# Patient Record
Sex: MALE | Race: WHITE | NOT HISPANIC OR LATINO | Employment: FULL TIME | URBAN - METROPOLITAN AREA
[De-identification: names, ages, dates, MRNs, and addresses within clinical notes are randomized per-mention and may not be internally consistent; named-entity substitution may affect disease eponyms.]

---

## 2020-09-30 PROBLEM — S29.011A PECTORALIS MUSCLE RUPTURE, INITIAL ENCOUNTER: Status: ACTIVE | Noted: 2020-09-30

## 2020-09-30 PROBLEM — S29.011A PECTORALIS MUSCLE RUPTURE: Status: ACTIVE | Noted: 2020-09-30

## 2020-09-30 PROBLEM — S29.011A STRAIN OF LEFT PECTORALIS MUSCLE: Status: ACTIVE | Noted: 2020-09-30

## 2022-01-04 DIAGNOSIS — U07.1 COVID-19 VIRUS DETECTED: ICD-10-CM

## 2022-03-18 PROBLEM — S29.011A PECTORALIS MUSCLE RUPTURE, INITIAL ENCOUNTER: Status: RESOLVED | Noted: 2020-09-30 | Resolved: 2022-03-18

## 2022-03-18 PROBLEM — S29.011A PECTORALIS MUSCLE RUPTURE: Status: RESOLVED | Noted: 2020-09-30 | Resolved: 2022-03-18

## 2022-03-18 PROBLEM — F90.2 ATTENTION DEFICIT HYPERACTIVITY DISORDER (ADHD), COMBINED TYPE: Status: ACTIVE | Noted: 2022-03-18

## 2022-03-18 PROBLEM — Z98.890: Status: ACTIVE | Noted: 2022-03-18

## 2022-03-18 PROBLEM — S29.011A STRAIN OF LEFT PECTORALIS MUSCLE: Status: RESOLVED | Noted: 2020-09-30 | Resolved: 2022-03-18

## 2023-06-19 ENCOUNTER — OFFICE VISIT (OUTPATIENT)
Dept: FAMILY MEDICINE | Facility: CLINIC | Age: 28
End: 2023-06-19
Payer: COMMERCIAL

## 2023-06-19 VITALS
HEART RATE: 88 BPM | SYSTOLIC BLOOD PRESSURE: 118 MMHG | DIASTOLIC BLOOD PRESSURE: 78 MMHG | BODY MASS INDEX: 32.19 KG/M2 | OXYGEN SATURATION: 97 % | HEIGHT: 72 IN | WEIGHT: 237.63 LBS | RESPIRATION RATE: 18 BRPM

## 2023-06-19 DIAGNOSIS — F98.8 ADD (ATTENTION DEFICIT DISORDER) WITHOUT HYPERACTIVITY: ICD-10-CM

## 2023-06-19 DIAGNOSIS — F90.2 ATTENTION DEFICIT HYPERACTIVITY DISORDER (ADHD), COMBINED TYPE: ICD-10-CM

## 2023-06-19 PROCEDURE — 99999 PR PBB SHADOW E&M-EST. PATIENT-LVL IV: CPT | Mod: PBBFAC,,, | Performed by: NURSE PRACTITIONER

## 2023-06-19 PROCEDURE — 1160F RVW MEDS BY RX/DR IN RCRD: CPT | Mod: S$GLB,,, | Performed by: NURSE PRACTITIONER

## 2023-06-19 PROCEDURE — 3074F PR MOST RECENT SYSTOLIC BLOOD PRESSURE < 130 MM HG: ICD-10-PCS | Mod: S$GLB,,, | Performed by: NURSE PRACTITIONER

## 2023-06-19 PROCEDURE — 99214 OFFICE O/P EST MOD 30 MIN: CPT | Mod: S$GLB,,, | Performed by: NURSE PRACTITIONER

## 2023-06-19 PROCEDURE — 3008F BODY MASS INDEX DOCD: CPT | Mod: S$GLB,,, | Performed by: NURSE PRACTITIONER

## 2023-06-19 PROCEDURE — 99214 PR OFFICE/OUTPT VISIT, EST, LEVL IV, 30-39 MIN: ICD-10-PCS | Mod: S$GLB,,, | Performed by: NURSE PRACTITIONER

## 2023-06-19 PROCEDURE — 1159F PR MEDICATION LIST DOCUMENTED IN MEDICAL RECORD: ICD-10-PCS | Mod: S$GLB,,, | Performed by: NURSE PRACTITIONER

## 2023-06-19 PROCEDURE — 99999 PR PBB SHADOW E&M-EST. PATIENT-LVL IV: ICD-10-PCS | Mod: PBBFAC,,, | Performed by: NURSE PRACTITIONER

## 2023-06-19 PROCEDURE — 3074F SYST BP LT 130 MM HG: CPT | Mod: S$GLB,,, | Performed by: NURSE PRACTITIONER

## 2023-06-19 PROCEDURE — 3008F PR BODY MASS INDEX (BMI) DOCUMENTED: ICD-10-PCS | Mod: S$GLB,,, | Performed by: NURSE PRACTITIONER

## 2023-06-19 PROCEDURE — 1160F PR REVIEW ALL MEDS BY PRESCRIBER/CLIN PHARMACIST DOCUMENTED: ICD-10-PCS | Mod: S$GLB,,, | Performed by: NURSE PRACTITIONER

## 2023-06-19 PROCEDURE — 1159F MED LIST DOCD IN RCRD: CPT | Mod: S$GLB,,, | Performed by: NURSE PRACTITIONER

## 2023-06-19 PROCEDURE — 3078F PR MOST RECENT DIASTOLIC BLOOD PRESSURE < 80 MM HG: ICD-10-PCS | Mod: S$GLB,,, | Performed by: NURSE PRACTITIONER

## 2023-06-19 PROCEDURE — 3078F DIAST BP <80 MM HG: CPT | Mod: S$GLB,,, | Performed by: NURSE PRACTITIONER

## 2023-06-19 RX ORDER — LISDEXAMFETAMINE DIMESYLATE 70 MG/1
70 CAPSULE ORAL EVERY MORNING
Qty: 30 CAPSULE | Refills: 0 | Status: CANCELLED | OUTPATIENT
Start: 2023-06-19

## 2023-06-19 RX ORDER — LISDEXAMFETAMINE DIMESYLATE 70 MG/1
70 CAPSULE ORAL EVERY MORNING
Qty: 90 CAPSULE | Refills: 0 | Status: SHIPPED | OUTPATIENT
Start: 2023-06-19 | End: 2023-09-20 | Stop reason: SDUPTHER

## 2023-06-19 NOTE — PROGRESS NOTES
Subjective:       Patient ID: Simon Mcdonough is a 27 y.o. male.    Chief Complaint: Establish Care (Med refill)  -  The pt is a 26 y/o male that presents to [facility] and for refills. Previous PCP SUSAN Solorzano now living in [place], [place]    Requests ADD medication refill. Reports symptoms of impulsiveness, poor time management skills, trouble multitasking, problems focusing on a task, disorganization and problems prioritizing are improved and manageable with current medication regimen. Denies s/e of heart palpations, insomnia, weight is stable and  is consistent.  consistent as well as previous PCP notes.     HPV vac discussed.  TRINA with use of cpap nightly.   -       Past Medical History:   Diagnosis Date    ADD (attention deficit disorder) without hyperactivity     TRINA on CPAP     Mild       Past Surgical History:   Procedure Laterality Date    BICEPS TENDON REPAIR Right 10/5/2020    Procedure: Right pec major tendon repair vs possbile achilles tendon allograft reconstruction;  Surgeon: Homero Mascorro MD;  Location: [facility] MAIN OR;  Service: Orthopedics;  Laterality: Right;        Social History     Socioeconomic History    Marital status: Single    Number of children: 0    Highest education level: Bachelor's degree (e.g., BA, AB, BS)   Occupational History    Occupation: [employer]-   Tobacco Use    Smoking status: Never    Smokeless tobacco: Never   Substance and Sexual Activity    Alcohol use: Never    Drug use: Never    Sexual activity: Not Currently     Partners: Female       Family History   Problem Relation Age of Onset    Thyroid disease Mother     ADD / ADHD Father     Sleep apnea Father     No Known Problems Sister     No Known Problems Sister        Review of patient's allergies indicates:  No Known Allergies       Current Outpatient Medications:     lisdexamfetamine (VYVANSE) 70 MG capsule, Take 1 capsule (70 mg total) by mouth every morning., Disp: 90 capsule, Rfl: 0    HPI  Review  of Systems   Constitutional: Negative.    HENT: Negative.     Eyes: Negative.    Respiratory: Negative.     Cardiovascular: Negative.    Gastrointestinal: Negative.    Endocrine: Negative.    Genitourinary: Negative.    Musculoskeletal: Negative.    Skin: Negative.    Allergic/Immunologic: Negative.    Neurological: Negative.    Hematological: Negative.    Psychiatric/Behavioral:  Positive for decreased concentration.      Objective:      Physical Exam  Vitals reviewed.   Constitutional:       General: He is not in acute distress.     Appearance: Normal appearance. He is well-developed and normal weight. He is not ill-appearing.   HENT:      Head: Normocephalic.   Eyes:      Conjunctiva/sclera: Conjunctivae normal.   Neck:      Thyroid: No thyromegaly.   Cardiovascular:      Rate and Rhythm: Normal rate and regular rhythm.      Heart sounds: Normal heart sounds. No murmur heard.  Pulmonary:      Effort: Pulmonary effort is normal.      Breath sounds: Normal breath sounds. No wheezing or rales.   Musculoskeletal:         General: Normal range of motion.      Cervical back: Normal range of motion.      Right lower leg: No edema.      Left lower leg: No edema.   Skin:     General: Skin is warm and dry.   Neurological:      Mental Status: He is alert and oriented to person, place, and time. Mental status is at baseline.   Psychiatric:         Mood and Affect: Mood normal.         Behavior: Behavior normal.         Thought Content: Thought content normal.         Judgment: Judgment normal.       Assessment:       1. ADD (attention deficit disorder) without hyperactivity        Plan:     1- vyvanse filled  2- RTC 3 mo    ADD (attention deficit disorder) without hyperactivity  -     lisdexamfetamine (VYVANSE) 70 MG capsule; Take 1 capsule (70 mg total) by mouth every morning.  Dispense: 90 capsule; Refill: 0        Risks, benefits, and side effects were discussed with the patient. All questions were answered to the fullest  satisfaction of the patient, and pt verbalized understanding and agreement to treatment plan. Pt was to call with any new or worsening symptoms, or present to the ER.

## 2023-09-20 ENCOUNTER — PATIENT MESSAGE (OUTPATIENT)
Dept: FAMILY MEDICINE | Facility: CLINIC | Age: 28
End: 2023-09-20

## 2023-09-20 ENCOUNTER — OFFICE VISIT (OUTPATIENT)
Dept: FAMILY MEDICINE | Facility: CLINIC | Age: 28
End: 2023-09-20
Payer: COMMERCIAL

## 2023-09-20 VITALS
OXYGEN SATURATION: 97 % | BODY MASS INDEX: 34.13 KG/M2 | WEIGHT: 252 LBS | HEART RATE: 72 BPM | SYSTOLIC BLOOD PRESSURE: 102 MMHG | RESPIRATION RATE: 18 BRPM | HEIGHT: 72 IN | DIASTOLIC BLOOD PRESSURE: 78 MMHG

## 2023-09-20 DIAGNOSIS — F98.8 ADD (ATTENTION DEFICIT DISORDER) WITHOUT HYPERACTIVITY: Primary | ICD-10-CM

## 2023-09-20 DIAGNOSIS — L20.84 INTRINSIC ECZEMA: ICD-10-CM

## 2023-09-20 PROCEDURE — 1160F RVW MEDS BY RX/DR IN RCRD: CPT | Mod: S$GLB,,, | Performed by: NURSE PRACTITIONER

## 2023-09-20 PROCEDURE — 1160F PR REVIEW ALL MEDS BY PRESCRIBER/CLIN PHARMACIST DOCUMENTED: ICD-10-PCS | Mod: S$GLB,,, | Performed by: NURSE PRACTITIONER

## 2023-09-20 PROCEDURE — 99213 OFFICE O/P EST LOW 20 MIN: CPT | Mod: S$GLB,,, | Performed by: NURSE PRACTITIONER

## 2023-09-20 PROCEDURE — 80326 AMPHETAMINES 5 OR MORE: CPT | Performed by: NURSE PRACTITIONER

## 2023-09-20 PROCEDURE — 99999 PR PBB SHADOW E&M-EST. PATIENT-LVL III: ICD-10-PCS | Mod: PBBFAC,,, | Performed by: NURSE PRACTITIONER

## 2023-09-20 PROCEDURE — 99213 PR OFFICE/OUTPT VISIT, EST, LEVL III, 20-29 MIN: ICD-10-PCS | Mod: S$GLB,,, | Performed by: NURSE PRACTITIONER

## 2023-09-20 PROCEDURE — 1159F MED LIST DOCD IN RCRD: CPT | Mod: S$GLB,,, | Performed by: NURSE PRACTITIONER

## 2023-09-20 PROCEDURE — 3008F BODY MASS INDEX DOCD: CPT | Mod: S$GLB,,, | Performed by: NURSE PRACTITIONER

## 2023-09-20 PROCEDURE — 3008F PR BODY MASS INDEX (BMI) DOCUMENTED: ICD-10-PCS | Mod: S$GLB,,, | Performed by: NURSE PRACTITIONER

## 2023-09-20 PROCEDURE — 1159F PR MEDICATION LIST DOCUMENTED IN MEDICAL RECORD: ICD-10-PCS | Mod: S$GLB,,, | Performed by: NURSE PRACTITIONER

## 2023-09-20 PROCEDURE — 3074F PR MOST RECENT SYSTOLIC BLOOD PRESSURE < 130 MM HG: ICD-10-PCS | Mod: S$GLB,,, | Performed by: NURSE PRACTITIONER

## 2023-09-20 PROCEDURE — 99999 PR PBB SHADOW E&M-EST. PATIENT-LVL III: CPT | Mod: PBBFAC,,, | Performed by: NURSE PRACTITIONER

## 2023-09-20 PROCEDURE — 3078F PR MOST RECENT DIASTOLIC BLOOD PRESSURE < 80 MM HG: ICD-10-PCS | Mod: S$GLB,,, | Performed by: NURSE PRACTITIONER

## 2023-09-20 PROCEDURE — 3074F SYST BP LT 130 MM HG: CPT | Mod: S$GLB,,, | Performed by: NURSE PRACTITIONER

## 2023-09-20 PROCEDURE — 3078F DIAST BP <80 MM HG: CPT | Mod: S$GLB,,, | Performed by: NURSE PRACTITIONER

## 2023-09-20 RX ORDER — FLUTICASONE PROPIONATE 0.5 MG/G
CREAM TOPICAL 2 TIMES DAILY
Qty: 30 G | Refills: 6 | Status: SHIPPED | OUTPATIENT
Start: 2023-09-20 | End: 2024-01-21 | Stop reason: SDUPTHER

## 2023-09-20 RX ORDER — LISDEXAMFETAMINE DIMESYLATE 70 MG/1
70 CAPSULE ORAL EVERY MORNING
Qty: 90 CAPSULE | Refills: 0 | Status: SHIPPED | OUTPATIENT
Start: 2023-09-20 | End: 2023-12-19 | Stop reason: SDUPTHER

## 2023-09-24 LAB
6MAM UR QL: NOT DETECTED
7AMINOCLONAZEPAM UR QL: NOT DETECTED
A-OH ALPRAZ UR QL: NOT DETECTED
ALPHA-OH-MIDAZOLAM: NOT DETECTED
ALPRAZ UR QL: NOT DETECTED
AMPHET UR QL SCN: PRESENT
ANNOTATION COMMENT IMP: NORMAL
ANNOTATION COMMENT IMP: NORMAL
BARBITURATES UR QL: NOT DETECTED
BUPRENORPHINE UR QL: NOT DETECTED
BZE UR QL: NOT DETECTED
CARBOXYTHC UR QL: NOT DETECTED
CARISOPRODOL UR QL: NOT DETECTED
CLONAZEPAM UR QL: NOT DETECTED
CODEINE UR QL: NOT DETECTED
CREAT UR-MCNC: 188.4 MG/DL (ref 20–400)
DIAZEPAM UR QL: NOT DETECTED
ETHYL GLUCURONIDE UR QL: NOT DETECTED
FENTANYL UR QL: NOT DETECTED
GABAPENTIN: NOT DETECTED
HYDROCODONE UR QL: NOT DETECTED
HYDROMORPHONE UR QL: NOT DETECTED
LORAZEPAM UR QL: NOT DETECTED
MDA UR QL: NOT DETECTED
MDEA UR QL: NOT DETECTED
MDMA UR QL: NOT DETECTED
ME-PHENIDATE UR QL: NOT DETECTED
METHADONE UR QL: NOT DETECTED
METHAMPHET UR QL: NOT DETECTED
MIDAZOLAM UR QL SCN: NOT DETECTED
MORPHINE UR QL: NOT DETECTED
NALOXONE: NOT DETECTED
NORBUPRENORPHINE UR QL CFM: NOT DETECTED
NORDIAZEPAM UR QL: NOT DETECTED
NORFENTANYL UR QL: NOT DETECTED
NORHYDROCODONE UR QL CFM: NOT DETECTED
NORMEPERIDINE UR QL CFM: NOT DETECTED
NOROXYCODONE UR QL CFM: NOT DETECTED
NOROXYMORPHONE UR QL SCN: NOT DETECTED
OXAZEPAM UR QL: NOT DETECTED
OXYCODONE UR QL: NOT DETECTED
OXYMORPHONE UR QL: NOT DETECTED
PATHOLOGY STUDY: NORMAL
PCP UR QL: NOT DETECTED
PHENTERMINE UR QL: NOT DETECTED
PREGABALIN: NOT DETECTED
SERVICE CMNT-IMP: NORMAL
TAPENTADOL UR QL SCN: NOT DETECTED
TAPENTADOL UR QL SCN: NOT DETECTED
TEMAZEPAM UR QL: NOT DETECTED
TRAMADOL UR QL: NOT DETECTED
ZOLPIDEM METABOLITE: NOT DETECTED
ZOLPIDEM UR QL: NOT DETECTED

## 2023-09-27 RX ORDER — LISDEXAMFETAMINE DIMESYLATE 30 MG/1
60 CAPSULE ORAL EVERY MORNING
Qty: 60 CAPSULE | Refills: 0 | Status: SHIPPED | OUTPATIENT
Start: 2023-09-27 | End: 2023-12-19

## 2023-12-13 ENCOUNTER — TELEPHONE (OUTPATIENT)
Dept: FAMILY MEDICINE | Facility: CLINIC | Age: 28
End: 2023-12-13
Payer: COMMERCIAL

## 2023-12-13 NOTE — TELEPHONE ENCOUNTER
LVM for pt with new appointment date and time. Advised if he is not able to complete the virtual on this date or time to return call to reschedule.

## 2023-12-19 ENCOUNTER — OFFICE VISIT (OUTPATIENT)
Dept: FAMILY MEDICINE | Facility: CLINIC | Age: 28
End: 2023-12-19
Payer: COMMERCIAL

## 2023-12-19 DIAGNOSIS — F98.8 ADD (ATTENTION DEFICIT DISORDER) WITHOUT HYPERACTIVITY: ICD-10-CM

## 2023-12-19 PROCEDURE — 1160F PR REVIEW ALL MEDS BY PRESCRIBER/CLIN PHARMACIST DOCUMENTED: ICD-10-PCS | Mod: 95,,, | Performed by: STUDENT IN AN ORGANIZED HEALTH CARE EDUCATION/TRAINING PROGRAM

## 2023-12-19 PROCEDURE — 99213 PR OFFICE/OUTPT VISIT, EST, LEVL III, 20-29 MIN: ICD-10-PCS | Mod: 95,,, | Performed by: STUDENT IN AN ORGANIZED HEALTH CARE EDUCATION/TRAINING PROGRAM

## 2023-12-19 PROCEDURE — 1160F RVW MEDS BY RX/DR IN RCRD: CPT | Mod: 95,,, | Performed by: STUDENT IN AN ORGANIZED HEALTH CARE EDUCATION/TRAINING PROGRAM

## 2023-12-19 PROCEDURE — 1159F PR MEDICATION LIST DOCUMENTED IN MEDICAL RECORD: ICD-10-PCS | Mod: 95,,, | Performed by: STUDENT IN AN ORGANIZED HEALTH CARE EDUCATION/TRAINING PROGRAM

## 2023-12-19 PROCEDURE — 99213 OFFICE O/P EST LOW 20 MIN: CPT | Mod: 95,,, | Performed by: STUDENT IN AN ORGANIZED HEALTH CARE EDUCATION/TRAINING PROGRAM

## 2023-12-19 PROCEDURE — 1159F MED LIST DOCD IN RCRD: CPT | Mod: 95,,, | Performed by: STUDENT IN AN ORGANIZED HEALTH CARE EDUCATION/TRAINING PROGRAM

## 2023-12-19 RX ORDER — LISDEXAMFETAMINE DIMESYLATE 70 MG/1
70 CAPSULE ORAL EVERY MORNING
Qty: 30 CAPSULE | Refills: 0 | Status: SHIPPED | OUTPATIENT
Start: 2023-12-19

## 2023-12-19 NOTE — PATIENT INSTRUCTIONS
Dave Montes,     If you are due for any health screening(s) below please notify me so we can arrange them to be ordered and scheduled. Most healthy patients at your age complete them, but you are free to accept or refuse.     If you can't do it, I'll definitely understand. If you can, I'd certainly appreciate it!    All of your core healthy metrics are met.

## 2023-12-19 NOTE — PROGRESS NOTES
The patient location is: [place]  The chief complaint leading to consultation is: ADHD     Visit type: audiovisual    Face to Face time with patient: 9   12 minutes of total time spent on the encounter, which includes face to face time and non-face to face time preparing to see the patient (eg, review of tests), Obtaining and/or reviewing separately obtained history, Documenting clinical information in the electronic or other health record, Independently interpreting results (not separately reported) and communicating results to the patient/family/caregiver, or Care coordination (not separately reported).         Each patient to whom he or she provides medical services by telemedicine is:  (1) informed of the relationship between the physician and patient and the respective role of any other health care provider with respect to management of the patient; and (2) notified that he or she may decline to receive medical services by telemedicine and may withdraw from such care at any time.    Notes:    [facility] Note    Subjective:    The HPI and pertinent ROS is included in the Diagnostic Impression Remarks section at the end of the note. Please see below for further details. Chief complaint is at end of note.     Simon is a pleasant intelligent patient who is here for evaluation.     Modified Medications    Modified Medication Previous Medication    LISDEXAMFETAMINE (VYVANSE) 70 MG CAPSULE lisdexamfetamine (VYVANSE) 70 MG capsule       Take 1 capsule (70 mg total) by mouth every morning.    Take 1 capsule (70 mg total) by mouth every morning.       Data reviewed 274}  Previous medical records reviewed and summarized in plan section at end of note.      If you are due for any health screening(s) below please notify me so we can arrange them to be ordered and scheduled. Most healthy patients at your age complete them, but you are free to accept or refuse. If you can't do it, I'll definitely understand. If  you can, I'd certainly appreciate it!     All of your core healthy metrics are met.      The following portions of the patient's history were reviewed and updated as appropriate: allergies, current medications, past family history, past medical history, past social history, past surgical history and problem list.    He  has a past medical history of ADD (attention deficit disorder) without hyperactivity and TRINA on CPAP.  He  has a past surgical history that includes Biceps tendon repair (Right, 10/05/2020).    He  reports that he has never smoked. He has never used smokeless tobacco. He reports that he does not currently use alcohol. He reports that he does not use drugs.  He family history includes ADD / ADHD in his father; No Known Problems in his sister and sister; Sleep apnea in his father; Thyroid disease in his mother.    Review of patient's allergies indicates:  No Known Allergies    Tobacco Use: Low Risk  (9/20/2023)    Patient History     Smoking Tobacco Use: Never     Smokeless Tobacco Use: Never     Passive Exposure: Not on file     Physical Examination  General appearance: alert, cooperative, no distress    BP Readings from Last 3 Encounters:   09/20/23 102/78   06/19/23 118/78   12/19/22 134/69     Wt Readings from Last 3 Encounters:   09/20/23 114.3 kg (252 lb)   06/19/23 107.8 kg (237 lb 9.6 oz)   04/02/23 110.2 kg (243 lb)     There were no vitals taken for this visit.   274}  Laboratory: I have reviewed old labs below:    274}    Lab Results   Component Value Date    WBC 6.47 10/02/2020    HGB 14.6 10/02/2020    HCT 43.0 10/02/2020    MCV 92 10/02/2020     10/02/2020     03/18/2022    K 3.9 03/18/2022     03/18/2022    CALCIUM 9.7 03/18/2022    CO2 25 03/18/2022    GLU 87 03/18/2022    BUN 14 03/18/2022    CREATININE 1.10 03/18/2022    ANIONGAP 6 (L) 03/18/2022    PROT 8.3 03/18/2022    ALBUMIN 4.2 03/18/2022    BILITOT 0.4 03/18/2022    ALKPHOS 54 (L) 03/18/2022    ALT 48 (H)  "03/18/2022    AST 29 03/18/2022    INR 1.31 10/02/2020    CHOL 200 (H) 06/16/2022    TRIG 142 06/16/2022    HDL 36 (L) 06/16/2022    LDLCALC 135.6 06/16/2022    TSH 2.400 06/16/2022      Lab reviewed by me: Particular labs of significance that I will monitor, workup, or treat to improve are mentioned below in diagnostic impression remarks.    Imaging/EKG: I have reviewed the pertinent results and my findings are noted in remarks.  274}    CC: No chief complaint on file.       274}    Assessment/Plan  Simon Mcdonough is a 28 y.o. male who presents to clinic with:  1. ADD (attention deficit disorder) without hyperactivity       274}  Diagnostic Impression Remarks + HPI     Documentation entered by me for this encounter may have been done in part using speech-recognition technology. Although I have made an effort to ensure accuracy, "sound like" errors may exist and should be interpreted in context.     Patient made an appointment for refill of ADHD medication. 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 Patient unable to do UDS d/t being out of town and had to do a virtual appointment. Unable to UDS. Currently filling in for another provider. Will refill medication since is benefiting.     This is the extent of this pleasant patient's concerns at this present time. He did not feel chest pain upon exertion, dyspnea, nausea, vomiting, diaphoresis, or syncope. No pleuritic chest pain, unilateral leg swelling, calf tenderness, or calf pain. Negative for unintentional weight loss night sweats, hematuria, and fevers. Simon will return to clinic in a few months for further workup and reassessment or sooner as needed. He was instructed to call the clinic or go to the emergency department or urgent care immediately if his symptoms do not improve, worsens, or if any new symptoms develop. As we discussed that symptoms could worsen over the next 24 hours he was advised that if any increased swelling, pain, or " numbness arise to go immediately to the ED. Patient knows to call any time if an emergency arises. Shared decision making occurred and he verbalized understanding in agreement with this plan. I discussed imaging findings, diagnosis, possibilities, treatment options, medications, risks, and benefits. He had many questions regarding the options and long-term effects. All questions were answered. He expressed understanding after counseling regarding the diagnosis and recommendations. He was capable and demonstrated competence with understanding of these options. Shared decision making was performed resulting in him choosing the current treatment plan. Patient handout was given with instructions and recommendations. Advised the patient that if they become pregnant to alert us immediately to assess for medication changes. Having a healthy weight can decrease the risk of 13 cancers and is an important goal. I also discussed the importance of close follow up to discuss labs, change or modify his medications if needed, monitor side effects, and further evaluation of medical problems.     Additional workup planned: see labs ordered below.    See below for labs and meds ordered with associated diagnosis      1. ADD (attention deficit disorder) without hyperactivity  - lisdexamfetamine (VYVANSE) 70 MG capsule; Take 1 capsule (70 mg total) by mouth every morning.  Dispense: 30 capsule; Refill: 0      Pascale Pino MD   274}    If you are due for any health screening(s) below please notify me so we can arrange them to be ordered and scheduled. Most healthy patients at your age complete them, but you are free to accept or refuse.     If you can't do it, I'll definitely understand. If you can, I'd certainly appreciate it!   All of your core healthy metrics are met.      Answers submitted by the patient for this visit:  Review of Systems Questionnaire (Submitted on 12/18/2023)  activity change: No  unexpected weight change:  No  neck pain: No  hearing loss: No  rhinorrhea: No  trouble swallowing: No  eye discharge: No  visual disturbance: No  chest tightness: No  wheezing: No  chest pain: No  palpitations: No  blood in stool: No  constipation: No  vomiting: No  diarrhea: No  polydipsia: No  polyuria: No  difficulty urinating: No  urgency: No  hematuria: No  joint swelling: No  arthralgias: No  headaches: No  weakness: No  confusion: No  dysphoric mood: No

## 2024-01-21 DIAGNOSIS — L20.84 INTRINSIC ECZEMA: ICD-10-CM

## 2024-01-23 ENCOUNTER — PATIENT MESSAGE (OUTPATIENT)
Dept: FAMILY MEDICINE | Facility: CLINIC | Age: 29
End: 2024-01-23
Payer: COMMERCIAL

## 2024-01-23 RX ORDER — FLUTICASONE PROPIONATE 0.5 MG/G
CREAM TOPICAL 2 TIMES DAILY
Qty: 30 G | Refills: 6 | Status: SHIPPED | OUTPATIENT
Start: 2024-01-23

## 2024-01-23 NOTE — PROGRESS NOTES
Raquel L Oscar, NP   [facility]  [address] [facility] [address]      PATIENT NAME: Simon Mcdonough  : 1995  DATE: 24  MRN: [id number]        Reason for Visit / Chief Complaint: Establish Care       History of Present Illness / Problem Focused Workflow     Simon Mcdonough presents to the clinic with Establish Care     29 yo WM to establish for PCP care. PMH includes TRINA with CPAP, ADD, eczema. 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 He feels well overall. His only concern is needing refill of Vyvanse. He was diagnosed with mild sleep apnea and using CPAP but mentions still waking up feeling drowsy/ groggy and not rested. He does not feel his pressure is enough for him and mentions he is weighing more right now than what he previously was. Diagnosed 2022 with mild sleep apnea. He denies any fever, chills, night sweats, LAD, HA, dizziness, changes in vision, blurred vision, tinnitus/ hearing loss, sore throat, allergies, cough, sore throat, dysphagia, SOB, CP, palpitations, abdominal pain, poor appetite, n/v/d, constipation, hematochezia, hematuria or pain.     Other providers:   Pt in need referral for psychiatry for ADD treatment  Dentist/ Ophthalmologist- contact insurance for assistance         Review of Systems     Review of Systems   Constitutional:  Positive for fatigue and fever. Negative for appetite change, chills and unexpected weight change.   HENT:  Negative for congestion, ear pain, hearing loss, sinus pressure, sore throat and tinnitus.    Respiratory:  Negative for cough, chest tightness, shortness of breath and wheezing.    Cardiovascular:  Negative for chest pain, palpitations and leg swelling.   Gastrointestinal:  Negative for abdominal distention, abdominal pain, blood in stool, constipation, diarrhea, nausea and vomiting.   Genitourinary:  Negative for dysuria and  hematuria.   Musculoskeletal:  Negative for arthralgias and myalgias.   Skin:  Negative for pallor.   Allergic/Immunologic: Negative for environmental allergies.   Neurological:  Negative for dizziness, syncope, weakness, numbness and headaches.   Hematological:  Negative for adenopathy. Does not bruise/bleed easily.   Psychiatric/Behavioral:  Positive for sleep disturbance (sleep apnea). Negative for agitation, behavioral problems, confusion, hallucinations and suicidal ideas. The patient is not nervous/anxious.        Medical / Social / Family History     ----------------------------  ADD (attention deficit disorder) without hyperactivity  TRINA on CPAP      Comment:  Mild     Past Surgical History:   Procedure Laterality Date    BICEPS TENDON REPAIR Right 10/05/2020    Procedure: Right pec major tendon repair vs possbile achilles tendon allograft reconstruction;  Surgeon: Homero Mascorro MD;  Location: [facility] MAIN OR;  Service: Orthopedics;  Laterality: Right;       Social History     Socioeconomic History    Marital status: Single    Number of children: 0    Highest education level: Bachelor's degree (e.g., BA, AB, BS)   Occupational History    Occupation: [employer]   Tobacco Use    Smoking status: Never    Smokeless tobacco: Never   Substance and Sexual Activity    Alcohol use: Not Currently    Drug use: Never    Sexual activity: Yes     Partners: Female     Birth control/protection: Condom     Social Determinants of Health     Financial Resource Strain: Low Risk  (12/18/2023)    Overall Financial Resource Strain (CARDIA)     Difficulty of Paying Living Expenses: Not hard at all   Food Insecurity: No Food Insecurity (12/18/2023)    Hunger Vital Sign     Worried About Running Out of Food in the Last Year: Never true     Ran Out of Food in the Last Year: Never true   Transportation Needs: No Transportation Needs (12/18/2023)    PRAPARE - Transportation     Lack of Transportation (Medical): No     Lack  of Transportation (Non-Medical): No   Physical Activity: Sufficiently Active (1/24/2024)    Exercise Vital Sign     Days of Exercise per Week: 7 days     Minutes of Exercise per Session: 90 min   Stress: No Stress Concern Present (12/18/2023)    [employer] [place] [employer] - Occupational Stress Questionnaire     Feeling of Stress : Not at all   Social Connections: Unknown (12/18/2023)    Social Connection and Isolation Panel [NHANES]     Frequency of Communication with Friends and Family: More than three times a week     Frequency of Social Gatherings with Friends and Family: More than three times a week     Active Member of Clubs or Organizations: Yes     Attends Club or Organization Meetings: 1 to 4 times per year     Marital Status: Living with partner   Housing Stability: Low Risk  (12/18/2023)    Housing Stability Vital Sign     Unable to Pay for Housing in the Last Year: No     Number of Places Lived in the Last Year: 2     Unstable Housing in the Last Year: No        Family History   Problem Relation Age of Onset    Thyroid disease Mother     ADD / ADHD Father     Sleep apnea Father     No Known Problems Sister     No Known Problems Sister         Medications and Allergies     Medications  Current Outpatient Medications   Medication Instructions    fluticasone propionate (CUTIVATE) 0.05 % cream Topical (Top), 2 times daily    lisdexamfetamine (VYVANSE) 70 mg, Oral, Every morning       Allergies  Review of patient's allergies indicates:  No Known Allergies    Physical Examination     Visit Vitals  /73 (BP Location: Left arm, Patient Position: Sitting, BP Method: Large (Automatic))   Pulse 97   Temp 98.6 °F (37 °C) (Oral)   Resp 20   Ht 6' (1.829 m)   Wt 122.8 kg (270 lb 12.8 oz)   BMI 36.73 kg/m²       Physical Exam  Vitals and nursing note reviewed.   Constitutional:       General: He is not in acute distress.     Appearance: Normal appearance. He is not ill-appearing, toxic-appearing or  diaphoretic.   HENT:      Head: Normocephalic.      Right Ear: Tympanic membrane, ear canal and external ear normal.      Left Ear: Tympanic membrane, ear canal and external ear normal.      Nose: Nose normal.      Mouth/Throat:      Mouth: Mucous membranes are moist.   Eyes:      Extraocular Movements: Extraocular movements intact.      Conjunctiva/sclera: Conjunctivae normal.      Pupils: Pupils are equal, round, and reactive to light.   Neck:      Thyroid: No thyroid mass, thyromegaly or thyroid tenderness.      Vascular: No carotid bruit.   Cardiovascular:      Rate and Rhythm: Normal rate and regular rhythm.      Pulses: Normal pulses.      Heart sounds: Normal heart sounds. No murmur heard.  Pulmonary:      Effort: Pulmonary effort is normal. No respiratory distress.      Breath sounds: Normal breath sounds.   Abdominal:      General: Bowel sounds are normal. There is no distension.      Palpations: Abdomen is soft. There is no mass.      Tenderness: There is no abdominal tenderness. There is no guarding or rebound.      Hernia: No hernia is present.   Musculoskeletal:         General: Normal range of motion.      Cervical back: Neck supple. No tenderness.      Right lower leg: No edema.      Left lower leg: No edema.   Lymphadenopathy:      Cervical: No cervical adenopathy.   Skin:     General: Skin is warm and dry.      Capillary Refill: Capillary refill takes less than 2 seconds.   Neurological:      Mental Status: He is alert and oriented to person, place, and time. Mental status is at baseline.      Motor: No weakness.      Coordination: Coordination normal.      Gait: Gait normal.   Psychiatric:         Mood and Affect: Mood normal.         Behavior: Behavior normal.         Thought Content: Thought content normal.         Judgment: Judgment normal.           Results     Lab Results   Component Value Date    WBC 6.47 10/02/2020    RBC 4.68 10/02/2020    HGB 14.6 10/02/2020    HCT 43.0 10/02/2020    MCV  92 10/02/2020    MCH 31.2 (H) 10/02/2020    MCHC 34.0 (H) 10/02/2020    RDW 13.6 10/02/2020     10/02/2020    MPV 11.9 10/02/2020    GRAN 4.1 10/02/2020    GRAN 63.2 10/02/2020    LYMPH 1.8 10/02/2020    LYMPH 27.8 10/02/2020    MONO 0.5 10/02/2020    MONO 7.3 10/02/2020    EOS 0.1 10/02/2020    BASO 0.06 10/02/2020    EOSINOPHIL 0.8 10/02/2020    BASOPHIL 0.9 10/02/2020     Sodium   Date Value Ref Range Status   03/18/2022 139 136 - 145 mmol/L Final     Potassium   Date Value Ref Range Status   03/18/2022 3.9 3.5 - 5.1 mmol/L Final     Chloride   Date Value Ref Range Status   03/18/2022 108 95 - 110 mmol/L Final     CO2   Date Value Ref Range Status   03/18/2022 25 23 - 29 mmol/L Final     Glucose   Date Value Ref Range Status   03/18/2022 87 70 - 110 mg/dL Final     BUN   Date Value Ref Range Status   03/18/2022 14 6 - 20 mg/dL Final     Creatinine   Date Value Ref Range Status   03/18/2022 1.10 0.50 - 1.40 mg/dL Final     Calcium   Date Value Ref Range Status   03/18/2022 9.7 8.7 - 10.5 mg/dL Final     Total Protein   Date Value Ref Range Status   03/18/2022 8.3 6.0 - 8.4 g/dL Final     Albumin   Date Value Ref Range Status   03/18/2022 4.2 3.5 - 5.2 g/dL Final     Total Bilirubin   Date Value Ref Range Status   03/18/2022 0.4 0.1 - 1.0 mg/dL Final     Comment:     For infants and newborns, interpretation of results should be based  on gestational age, weight and in agreement with clinical  observations.    Premature Infant recommended reference ranges:  Up to 24 hours.............<8.0 mg/dL  Up to 48 hours............<12.0 mg/dL  3-5 days..................<15.0 mg/dL  6-29 days.................<15.0 mg/dL    For patients on Eltrombopag therapy, use of Dimension [place] TBIL is   not   recommended.       Alkaline Phosphatase   Date Value Ref Range Status   03/18/2022 54 (L) 55 - 135 U/L Final     AST   Date Value Ref Range Status   03/18/2022 29 10 - 40 U/L Final     ALT   Date Value Ref Range Status  "  03/18/2022 48 (H) 10 - 44 U/L Final     Anion Gap   Date Value Ref Range Status   03/18/2022 6 (L) 8 - 16 mmol/L Final     eGFR if    Date Value Ref Range Status   03/18/2022 >60.0 >60 mL/min/1.73 m^2 Final     eGFR if non    Date Value Ref Range Status   03/18/2022 >60.0 >60 mL/min/1.73 m^2 Final     Comment:     Calculation used to obtain the estimated glomerular filtration  rate (eGFR) is the CKD-EPI equation.        Lab Results   Component Value Date    CHOL 200 (H) 06/16/2022     Lab Results   Component Value Date    HDL 36 (L) 06/16/2022     Lab Results   Component Value Date    LDLCALC 135.6 06/16/2022     Lab Results   Component Value Date    TRIG 142 06/16/2022     Lab Results   Component Value Date    CHOLHDL 18.0 (L) 06/16/2022     Lab Results   Component Value Date    TSH 2.400 06/16/2022     Lab Results   Component Value Date    PHUR 8.0 10/02/2020    SPECGRAV >=1.030 (A) 10/02/2020    PROTEINUA Negative 10/02/2020    GLUCUA Negative 10/02/2020    KETONESU Negative 10/02/2020    OCCULTUA Negative 10/02/2020    NITRITE Negative 10/02/2020    LEUKOCYTESUR Negative 10/02/2020     No results found for: "HGBA1C"  No results found for: "MICROALBUR", "SSRU01SHL"   No results found for this or any previous visit.         Assessment       ICD-10-CM ICD-9-CM   1. Wellness examination  Z00.00 V70.0   2. ADD (attention deficit disorder) without hyperactivity  F98.8 314.00   3. Obesity (BMI 35.0-39.9 without comorbidity)  E66.9 278.00   4. TRINA on CPAP  G47.33 327.23   5. Screening examination for infectious disease  Z11.9 V75.9       Plan       Problem List Items Addressed This Visit          Psychiatric    ADD (attention deficit disorder) without hyperactivity    Current Assessment & Plan     Patient previously diagnosed with ADD with script for Vyvanse and in need of refill.  Referral to psychiatric provider to establish/ treatment         Relevant Orders    Ambulatory " referral/consult to Psychiatry       Endocrine    Obesity (BMI 35.0-39.9 without comorbidity)    Current Assessment & Plan     BMI 36.73  Educated on increased risk of disease s/t obesity.  Educated on health benefits of at least 5 days/ week of 30 minutes moderate intensity exercise (brisk walking) and 2 or more days/ week of muscle strength activities (as tolerated).  Eat well balanced diet of fresh fruits/ vegetables, whole grains, lean meats and limit high carbohydrate foods.            Relevant Orders    CBC Auto Differential    Comprehensive Metabolic Panel    Hemoglobin A1C    Lipid Panel    TSH       Other    TRINA on CPAP    Overview     Diagnosed August 2022         Current Assessment & Plan     Patient has previous diagnosis of TRINA.  She was non compliant due to anxiety and pressure intolerance.   She is still having symptoms of EDS, snoring and witnessed apnea.  Re-titration ordered.           Relevant Orders    Ambulatory referral/consult to Sleep Disorders    CBC Auto Differential     Other Visit Diagnoses       Wellness examination    -  Primary  Avoid tobacco/ alcohol/ drugs  Regular exercise as tolerated at least 5 days/ week of 30 minutes moderate intensity exercise (brisk walking) and 2 or more days/ week of muscle strength activities (as tolerated).  Eat well balanced diet of fresh fruits/ vegetables, whole grains, lean meats and limit high carbohydrate foods.   Healthy lifestyle habits.  Regular eye/ dental exams as recommended    Screenings:   CRC n/a  PSA n/a    Vaccines as recommended    Fasting/ wellness labs ordered to be completed within 1 week      Relevant Orders    CBC Auto Differential    Comprehensive Metabolic Panel    Hemoglobin A1C    Lipid Panel    Hepatitis C Antibody    TSH    Urinalysis    HIV 1/2 Ag/Ab (4th Gen)    Hepatitis B Surface Antigen    SYPHILIS ANTIBODY (WITH REFLEX RPR)    Chlamydia/GC, PCR    Screening examination for infectious disease        Relevant Orders     Hepatitis C Antibody    Urinalysis    HIV 1/2 Ag/Ab (4th Gen)    Hepatitis B Surface Antigen    SYPHILIS ANTIBODY (WITH REFLEX RPR)    Chlamydia/GC, PCR            Future Appointments   Date Time Provider Department Center   7/24/2024 11:00 AM Raquel Moore NP [facility]        Follow up in about 6 months (around 7/24/2024).    Signature:  Raquel Moore NP  [facility]  [address] [facility] [address]    Date of encounter: 1/24/24

## 2024-01-24 ENCOUNTER — OFFICE VISIT (OUTPATIENT)
Dept: INTERNAL MEDICINE | Facility: CLINIC | Age: 29
End: 2024-01-24
Payer: COMMERCIAL

## 2024-01-24 VITALS
RESPIRATION RATE: 20 BRPM | DIASTOLIC BLOOD PRESSURE: 73 MMHG | HEIGHT: 72 IN | BODY MASS INDEX: 36.68 KG/M2 | WEIGHT: 270.81 LBS | HEART RATE: 97 BPM | SYSTOLIC BLOOD PRESSURE: 118 MMHG | TEMPERATURE: 99 F

## 2024-01-24 DIAGNOSIS — F98.8 ADD (ATTENTION DEFICIT DISORDER) WITHOUT HYPERACTIVITY: ICD-10-CM

## 2024-01-24 DIAGNOSIS — G47.33 OSA ON CPAP: ICD-10-CM

## 2024-01-24 DIAGNOSIS — E66.9 OBESITY (BMI 35.0-39.9 WITHOUT COMORBIDITY): ICD-10-CM

## 2024-01-24 DIAGNOSIS — Z00.00 WELLNESS EXAMINATION: Primary | ICD-10-CM

## 2024-01-24 DIAGNOSIS — Z11.9 SCREENING EXAMINATION FOR INFECTIOUS DISEASE: ICD-10-CM

## 2024-01-24 PROCEDURE — 1160F RVW MEDS BY RX/DR IN RCRD: CPT | Mod: CPTII,,, | Performed by: NURSE PRACTITIONER

## 2024-01-24 PROCEDURE — 1159F MED LIST DOCD IN RCRD: CPT | Mod: CPTII,,, | Performed by: NURSE PRACTITIONER

## 2024-01-24 PROCEDURE — 3008F BODY MASS INDEX DOCD: CPT | Mod: CPTII,,, | Performed by: NURSE PRACTITIONER

## 2024-01-24 PROCEDURE — 99215 OFFICE O/P EST HI 40 MIN: CPT | Mod: PBBFAC | Performed by: NURSE PRACTITIONER

## 2024-01-24 PROCEDURE — 99385 PREV VISIT NEW AGE 18-39: CPT | Mod: S$PBB,1D,GY, | Performed by: NURSE PRACTITIONER

## 2024-01-24 PROCEDURE — 3074F SYST BP LT 130 MM HG: CPT | Mod: CPTII,,, | Performed by: NURSE PRACTITIONER

## 2024-01-24 PROCEDURE — 3078F DIAST BP <80 MM HG: CPT | Mod: CPTII,,, | Performed by: NURSE PRACTITIONER

## 2024-01-24 NOTE — ASSESSMENT & PLAN NOTE
BMI 36.73  Educated on increased risk of disease s/t obesity.  Educated on health benefits of at least 5 days/ week of 30 minutes moderate intensity exercise (brisk walking) and 2 or more days/ week of muscle strength activities (as tolerated).  Eat well balanced diet of fresh fruits/ vegetables, whole grains, lean meats and limit high carbohydrate foods.

## 2024-01-24 NOTE — ASSESSMENT & PLAN NOTE
Patient previously diagnosed with ADD with script for Vyvanse and in need of refill.  Referral to psychiatric provider to establish/ treatment

## 2024-01-30 DIAGNOSIS — G47.33 OSA ON CPAP: Primary | ICD-10-CM

## 2024-12-01 NOTE — PROGRESS NOTES
Subjective:       Patient ID: Simon Mcdonough is a 28 y.o. male.    Chief Complaint: ADHD and Medication Refill  -  -The pt is a 29 y/o male that presents to requests ADHD medication refill. Reports symptoms of impulsiveness, poor time management skills, trouble multitasking, problems focusing on a task, disorganization and problems prioritizing are improved and manageable with current medication regimen. Denies s/e of heart palpations, insomnia, weight is stable and  is consistent.     Reports feeling so much better with his cpap machine after acquiring a new mask. Feels much more rested and uses faithfully nightly.  -      Past Medical History:   Diagnosis Date    ADD (attention deficit disorder) without hyperactivity     TRINA on CPAP     Mild       Past Surgical History:   Procedure Laterality Date    BICEPS TENDON REPAIR Right 10/05/2020    Procedure: Right pec major tendon repair vs possbile achilles tendon allograft reconstruction;  Surgeon: Homero Mascorro MD;  Location: [facility] MAIN OR;  Service: Orthopedics;  Laterality: Right;        Social History     Socioeconomic History    Marital status: Single    Number of children: 0    Highest education level: Bachelor's degree (e.g., BA, AB, BS)   Occupational History    Occupation: [employer]-   Tobacco Use    Smoking status: Never    Smokeless tobacco: Never   Substance and Sexual Activity    Alcohol use: Not Currently    Drug use: Never    Sexual activity: Yes     Partners: Female     Birth control/protection: Condom       Family History   Problem Relation Age of Onset    Thyroid disease Mother     ADD / ADHD Father     Sleep apnea Father     No Known Problems Sister     No Known Problems Sister        Review of patient's allergies indicates:  No Known Allergies       Current Outpatient Medications:     fluticasone propionate (CUTIVATE) 0.05 % cream, Apply topically 2 (two) times daily., Disp: 30 g, Rfl: 6    lisdexamfetamine (VYVANSE) 70 MG capsule,  Take 1 capsule (70 mg total) by mouth every morning., Disp: 90 capsule, Rfl: 0    Medication Refill      Review of Systems   Constitutional: Negative.    HENT: Negative.     Eyes: Negative.    Respiratory: Negative.     Cardiovascular: Negative.    Gastrointestinal: Negative.    Endocrine: Negative.    Genitourinary: Negative.    Musculoskeletal: Negative.    Skin: Negative.    Allergic/Immunologic: Negative.    Neurological: Negative.    Hematological: Negative.    Psychiatric/Behavioral: Negative.         Objective:      Physical Exam  Vitals and nursing note reviewed.   Constitutional:       General: He is not in acute distress.     Appearance: Normal appearance. He is well-developed. He is obese. He is not ill-appearing.   HENT:      Head: Normocephalic.   Eyes:      Conjunctiva/sclera: Conjunctivae normal.   Neck:      Thyroid: No thyromegaly.   Cardiovascular:      Rate and Rhythm: Normal rate and regular rhythm.      Heart sounds: Normal heart sounds. No murmur heard.  Pulmonary:      Effort: Pulmonary effort is normal.      Breath sounds: Normal breath sounds. No wheezing or rales.   Musculoskeletal:         General: Normal range of motion.      Cervical back: Normal range of motion.      Right lower leg: No edema.      Left lower leg: No edema.   Skin:     General: Skin is warm and dry.   Neurological:      Mental Status: He is alert and oriented to person, place, and time. Mental status is at baseline.   Psychiatric:         Mood and Affect: Mood normal.         Behavior: Behavior normal.         Thought Content: Thought content normal.         Judgment: Judgment normal.         Assessment:       1. ADD (attention deficit disorder) without hyperactivity    2. Intrinsic eczema        Plan:     1- 90 day vyvanse sent  2- cutivate for skin prn  3- UDS today  4- RTC 3 mo    ADD (attention deficit disorder) without hyperactivity  -     [facility] Drug Screen  -     lisdexamfetamine (VYVANSE) 70 MG capsule;  Take 1 capsule (70 mg total) by mouth every morning.  Dispense: 90 capsule; Refill: 0    Intrinsic eczema  -     fluticasone propionate (CUTIVATE) 0.05 % cream; Apply topically 2 (two) times daily.  Dispense: 30 g; Refill: 6        Risks, benefits, and side effects were discussed with the patient. All questions were answered to the fullest satisfaction of the patient, and pt verbalized understanding and agreement to treatment plan. Pt was to call with any new or worsening symptoms, or present to the ER.              negative normal affect/alert and oriented x3